# Patient Record
Sex: FEMALE | Race: WHITE | NOT HISPANIC OR LATINO | Employment: FULL TIME | URBAN - NONMETROPOLITAN AREA
[De-identification: names, ages, dates, MRNs, and addresses within clinical notes are randomized per-mention and may not be internally consistent; named-entity substitution may affect disease eponyms.]

---

## 2024-02-21 ENCOUNTER — OFFICE VISIT (OUTPATIENT)
Dept: FAMILY MEDICINE CLINIC | Facility: CLINIC | Age: 29
End: 2024-02-21
Payer: COMMERCIAL

## 2024-02-21 VITALS
WEIGHT: 256.8 LBS | BODY MASS INDEX: 41.27 KG/M2 | HEIGHT: 66 IN | SYSTOLIC BLOOD PRESSURE: 130 MMHG | TEMPERATURE: 97.8 F | HEART RATE: 95 BPM | OXYGEN SATURATION: 96 % | DIASTOLIC BLOOD PRESSURE: 80 MMHG

## 2024-02-21 DIAGNOSIS — E55.9 VITAMIN D DEFICIENCY: ICD-10-CM

## 2024-02-21 DIAGNOSIS — R00.2 PALPITATIONS: Primary | ICD-10-CM

## 2024-02-21 LAB
25(OH)D3 SERPL-MCNC: 16.9 NG/ML (ref 30–100)
ALBUMIN SERPL-MCNC: 4.3 G/DL (ref 3.5–5.2)
ALBUMIN/GLOB SERPL: 1.4 G/DL
ALP SERPL-CCNC: 79 U/L (ref 39–117)
ALT SERPL W P-5'-P-CCNC: 27 U/L (ref 1–33)
ANION GAP SERPL CALCULATED.3IONS-SCNC: 10 MMOL/L (ref 5–15)
AST SERPL-CCNC: 22 U/L (ref 1–32)
BASOPHILS # BLD AUTO: 0.02 10*3/MM3 (ref 0–0.2)
BASOPHILS NFR BLD AUTO: 0.3 % (ref 0–1.5)
BILIRUB SERPL-MCNC: 0.4 MG/DL (ref 0–1.2)
BUN SERPL-MCNC: 15 MG/DL (ref 6–20)
BUN/CREAT SERPL: 17.6 (ref 7–25)
CALCIUM SPEC-SCNC: 9.4 MG/DL (ref 8.6–10.5)
CHLORIDE SERPL-SCNC: 105 MMOL/L (ref 98–107)
CO2 SERPL-SCNC: 25 MMOL/L (ref 22–29)
CREAT SERPL-MCNC: 0.85 MG/DL (ref 0.57–1)
DEPRECATED RDW RBC AUTO: 39.7 FL (ref 37–54)
EGFRCR SERPLBLD CKD-EPI 2021: 97 ML/MIN/1.73
EOSINOPHIL # BLD AUTO: 0.07 10*3/MM3 (ref 0–0.4)
EOSINOPHIL NFR BLD AUTO: 1.1 % (ref 0.3–6.2)
ERYTHROCYTE [DISTWIDTH] IN BLOOD BY AUTOMATED COUNT: 12.5 % (ref 12.3–15.4)
GLOBULIN UR ELPH-MCNC: 3.1 GM/DL
GLUCOSE SERPL-MCNC: 91 MG/DL (ref 65–99)
HBA1C MFR BLD: 5 % (ref 4.8–5.6)
HCT VFR BLD AUTO: 40.1 % (ref 34–46.6)
HGB BLD-MCNC: 13.4 G/DL (ref 12–15.9)
IMM GRANULOCYTES # BLD AUTO: 0.01 10*3/MM3 (ref 0–0.05)
IMM GRANULOCYTES NFR BLD AUTO: 0.2 % (ref 0–0.5)
LYMPHOCYTES # BLD AUTO: 1.37 10*3/MM3 (ref 0.7–3.1)
LYMPHOCYTES NFR BLD AUTO: 22.5 % (ref 19.6–45.3)
MAGNESIUM SERPL-MCNC: 2.3 MG/DL (ref 1.6–2.6)
MCH RBC QN AUTO: 29.4 PG (ref 26.6–33)
MCHC RBC AUTO-ENTMCNC: 33.4 G/DL (ref 31.5–35.7)
MCV RBC AUTO: 87.9 FL (ref 79–97)
MONOCYTES # BLD AUTO: 0.4 10*3/MM3 (ref 0.1–0.9)
MONOCYTES NFR BLD AUTO: 6.6 % (ref 5–12)
NEUTROPHILS NFR BLD AUTO: 4.23 10*3/MM3 (ref 1.7–7)
NEUTROPHILS NFR BLD AUTO: 69.3 % (ref 42.7–76)
NRBC BLD AUTO-RTO: 0 /100 WBC (ref 0–0.2)
PLATELET # BLD AUTO: 300 10*3/MM3 (ref 140–450)
PMV BLD AUTO: 10.9 FL (ref 6–12)
POTASSIUM SERPL-SCNC: 4.8 MMOL/L (ref 3.5–5.2)
PROT SERPL-MCNC: 7.4 G/DL (ref 6–8.5)
RBC # BLD AUTO: 4.56 10*6/MM3 (ref 3.77–5.28)
SODIUM SERPL-SCNC: 140 MMOL/L (ref 136–145)
TSH SERPL DL<=0.05 MIU/L-ACNC: 2.49 UIU/ML (ref 0.27–4.2)
WBC NRBC COR # BLD AUTO: 6.1 10*3/MM3 (ref 3.4–10.8)

## 2024-02-21 PROCEDURE — 99203 OFFICE O/P NEW LOW 30 MIN: CPT | Performed by: PHYSICIAN ASSISTANT

## 2024-02-21 PROCEDURE — 82306 VITAMIN D 25 HYDROXY: CPT | Performed by: PHYSICIAN ASSISTANT

## 2024-02-21 PROCEDURE — 83036 HEMOGLOBIN GLYCOSYLATED A1C: CPT | Performed by: PHYSICIAN ASSISTANT

## 2024-02-21 PROCEDURE — 80053 COMPREHEN METABOLIC PANEL: CPT | Performed by: PHYSICIAN ASSISTANT

## 2024-02-21 PROCEDURE — 85025 COMPLETE CBC W/AUTO DIFF WBC: CPT | Performed by: PHYSICIAN ASSISTANT

## 2024-02-21 PROCEDURE — 84443 ASSAY THYROID STIM HORMONE: CPT | Performed by: PHYSICIAN ASSISTANT

## 2024-02-21 PROCEDURE — 83735 ASSAY OF MAGNESIUM: CPT | Performed by: PHYSICIAN ASSISTANT

## 2024-02-21 RX ORDER — PROPRANOLOL HCL 60 MG
60 CAPSULE, EXTENDED RELEASE 24HR ORAL DAILY
Qty: 30 CAPSULE | Refills: 3 | Status: SHIPPED | OUTPATIENT
Start: 2024-02-21

## 2024-02-21 RX ORDER — MEDROXYPROGESTERONE ACETATE 150 MG/ML
150 INJECTION, SUSPENSION INTRAMUSCULAR
COMMUNITY

## 2024-02-21 NOTE — PROGRESS NOTES
"    Subjective        Chief Complaint  Elevated BP, vision changes    Subjective      Adrianna Escamilla is a 28 y.o. female who presents today to [facility] for Hypertension.  She has no significant past medical history.    Elevated blood pressure:  Elevated heart rate:  Palpitations:  She reports intermittent episodes of elevated heart rate on her smart watch up to the 140s.  These are associated with feeling of elevated blood pressure and blurring of vision bilaterally.  She reports at times she feels like her vision goes dark and she has to sit down.  This last for a few seconds and resolves on its own.  She has been on propranolol in the past and she takes this with these episodes, but not on a regular basis.  She denies any associated chest pains or shortness of breath.  No episodes of near syncope or actual syncopal episodes.  No previous heart monitor.  She does feel like stress at work contributes to these episodes.  They do not occur when she is not working.      Current Outpatient Medications:     Calcium Carb-Cholecalciferol (CALCIUM 500 + D PO), Take 1 each by mouth Daily., Disp: , Rfl:     medroxyPROGESTERone (DEPO-PROVERA) 150 MG/ML injection, Inject 1 mL into the appropriate muscle as directed by prescriber Every 3 (Three) Months., Disp: , Rfl:     propranolol LA (Inderal LA) 60 MG 24 hr capsule, Take 1 capsule by mouth Daily., Disp: 30 capsule, Rfl: 3      No Known Allergies    Objective     Objective   Vital Signs:  /80   Pulse 95   Temp 97.8 °F (36.6 °C) (Temporal)   Ht 167.6 cm (66\")   Wt 116 kg (256 lb 12.8 oz)   SpO2 96%   BMI 41.45 kg/m²   Estimated body mass index is 41.45 kg/m² as calculated from the following:    Height as of this encounter: 167.6 cm (66\").    Weight as of this encounter: 116 kg (256 lb 12.8 oz).    History reviewed. No pertinent past medical history.  History reviewed. No pertinent surgical history.    Social History " "    Socioeconomic History    Marital status: Single   Tobacco Use    Smoking status: Never     Passive exposure: Never    Smokeless tobacco: Never   Vaping Use    Vaping Use: Never used   Substance and Sexual Activity    Alcohol use: Yes    Drug use: Defer    Sexual activity: Defer      Physical Exam  Vitals and nursing note reviewed.   Constitutional:       General: She is not in acute distress.     Appearance: She is well-developed. She is not diaphoretic.   HENT:      Head: Normocephalic and atraumatic.   Eyes:      General: No scleral icterus.        Right eye: No discharge.         Left eye: No discharge.      Conjunctiva/sclera: Conjunctivae normal.   Cardiovascular:      Rate and Rhythm: Normal rate and regular rhythm.      Heart sounds: Normal heart sounds. No murmur heard.     No friction rub. No gallop.   Pulmonary:      Effort: Pulmonary effort is normal. No respiratory distress.      Breath sounds: Normal breath sounds. No wheezing or rales.   Chest:      Chest wall: No tenderness.   Musculoskeletal:         General: Normal range of motion.      Cervical back: Normal range of motion and neck supple.   Skin:     General: Skin is warm and dry.      Coloration: Skin is not pale.      Findings: No erythema or rash.   Neurological:      Mental Status: She is alert and oriented to person, place, and time.   Psychiatric:         Behavior: Behavior normal.        Result Review :  The following data was reviewed by: TK Isabel on 02/21/2024:  No results found for: \"CBCDIFFPANEL\", \"CMP\", \"HGBA1C\", \"TSH\", \"HDL\", \"LDL\", \"TRIG\", \"CHLPL\", \"UTSV237\"        Assessment / Plan         Assessment   Diagnoses and all orders for this visit:    1. Palpitations (Primary)  Laboratory studies obtained and listed below.  Limit caffeine, drink plenty of noncaffeinated fluids.  Obtain 48-hour Holter monitor.  Recommended wearing this while she is at work as this is when she has most of her symptoms.  After completion of " the Holter monitor, start propranolol LA 60 mg daily.  Take of the morning.  Recommend ophthalmic exam given reports of vision changes during episodes.   Return to clinic if no improvement noted or if symptoms are worsening.   -     CBC & Differential  -     Comprehensive Metabolic Panel  -     TSH  -     Hemoglobin A1c  -     Magnesium  -     Holter monitor - 48 hour; Future        -     propranolol LA (Inderal LA) 60 MG 24 hr capsule; Take 1 capsule by mouth Daily.  Dispense: 30 capsule; Refill: 3    2. Vitamin D deficiency  Currently taking a daily chewable vitamin with 500 mg of calcium and 1000 units of vitamin D.  Obtain updated vitamin D level.  -     Vitamin D 25 hydroxy       New Medications Ordered This Visit   Medications    propranolol LA (Inderal LA) 60 MG 24 hr capsule     Sig: Take 1 capsule by mouth Daily.     Dispense:  30 capsule     Refill:  3     Health Maintenance:   She has an upcoming appointment for her routine Pap smear next month with [facility] in [place], [place].  Consider one-time hepatitis C screening.  Consider HPV vaccines.  Tdap reported about 8 years ago when she stepped on a nail.  Consider updating in 2026.    Follow Up   Return in about 1 month (around 3/21/2024) for Recheck palpitations.    Patient was given instructions and counseling regarding her condition or for health maintenance advice. Please see specific information pulled into the AVS if appropriate.       This document has been electronically signed by TK Isabel   February 21, 2024 09:25 EST    Dictated Utilizing Dragon Dictation: Part of this note may be an electronic transcription/translation of spoken language to printed text using the Dragon Dictation System.

## 2024-02-22 RX ORDER — ERGOCALCIFEROL 1.25 MG/1
50000 CAPSULE ORAL WEEKLY
Qty: 5 CAPSULE | Refills: 1 | Status: SHIPPED | OUTPATIENT
Start: 2024-02-22

## 2024-03-26 ENCOUNTER — OFFICE VISIT (OUTPATIENT)
Dept: FAMILY MEDICINE CLINIC | Facility: CLINIC | Age: 29
End: 2024-03-26
Payer: COMMERCIAL

## 2024-03-26 VITALS
DIASTOLIC BLOOD PRESSURE: 80 MMHG | HEIGHT: 66 IN | HEART RATE: 68 BPM | SYSTOLIC BLOOD PRESSURE: 125 MMHG | TEMPERATURE: 97.7 F | WEIGHT: 255.8 LBS | BODY MASS INDEX: 41.11 KG/M2 | OXYGEN SATURATION: 98 %

## 2024-03-26 DIAGNOSIS — E55.9 VITAMIN D DEFICIENCY: ICD-10-CM

## 2024-03-26 DIAGNOSIS — R00.2 PALPITATIONS: Primary | ICD-10-CM

## 2024-03-26 PROCEDURE — 99213 OFFICE O/P EST LOW 20 MIN: CPT | Performed by: PHYSICIAN ASSISTANT

## 2024-03-26 RX ORDER — PROPRANOLOL HCL 60 MG
60 CAPSULE, EXTENDED RELEASE 24HR ORAL DAILY
Qty: 90 CAPSULE | Refills: 1 | Status: SHIPPED | OUTPATIENT
Start: 2024-03-26

## 2024-03-26 NOTE — PROGRESS NOTES
"    Subjective        Chief Complaint  Elevated BP, vision changes, palpitations     Subjective      Adrianna Escamilla is a 28 y.o. female who presents today to [facility] for Follow-up.  She has no significant past medical history.    Elevated blood pressure:  Elevated heart rate:  Palpitations:  Previously reported intermittent episodes of elevated heart rate on her smart watch up to the 140s.  These were associated with feeling of elevated blood pressure and blurring of vision bilaterally.  She underwent further evaluation with Holter monitor which showed rare PVCs and PACs.  No significant arrhythmias and overall considered unremarkable Holter per cardiology.  She was started on propranolol LA 60 mg daily.  She denies any further smart watch alarms or feelings of elevated blood pressure or vision changes.  No complaints of palpitations, dizziness, near syncopal episodes.  No chest pains or shortness of breath.    Vitamin D deficiency:   Previously placed on 50,000 weekly x 2 months, completed therapy this week.       Current Outpatient Medications:     propranolol LA (Inderal LA) 60 MG 24 hr capsule, Take 1 capsule by mouth Daily., Disp: 90 capsule, Rfl: 1    Calcium Carb-Cholecalciferol (CALCIUM 500 + D PO), Take 1 each by mouth Daily., Disp: , Rfl:     medroxyPROGESTERone (DEPO-PROVERA) 150 MG/ML injection, Inject 1 mL into the appropriate muscle as directed by prescriber Every 3 (Three) Months., Disp: , Rfl:       No Known Allergies    Objective     Objective   Vital Signs:  /80   Pulse 68   Temp 97.7 °F (36.5 °C) (Temporal)   Ht 167.6 cm (65.98\")   Wt 116 kg (255 lb 12.8 oz)   SpO2 98%   BMI 41.31 kg/m²   Estimated body mass index is 41.31 kg/m² as calculated from the following:    Height as of this encounter: 167.6 cm (65.98\").    Weight as of this encounter: 116 kg (255 lb 12.8 oz).    No past medical history on file.  No past surgical history on file.    Social " History     Socioeconomic History    Marital status: Single   Tobacco Use    Smoking status: Never     Passive exposure: Never    Smokeless tobacco: Never   Vaping Use    Vaping status: Never Used   Substance and Sexual Activity    Alcohol use: Yes    Drug use: Defer    Sexual activity: Defer      Physical Exam  Vitals and nursing note reviewed.   Constitutional:       General: She is not in acute distress.     Appearance: She is well-developed. She is not diaphoretic.   HENT:      Head: Normocephalic and atraumatic.   Eyes:      General: No scleral icterus.        Right eye: No discharge.         Left eye: No discharge.      Conjunctiva/sclera: Conjunctivae normal.   Cardiovascular:      Rate and Rhythm: Normal rate and regular rhythm.      Heart sounds: Normal heart sounds. No murmur heard.     No friction rub. No gallop.   Pulmonary:      Effort: Pulmonary effort is normal. No respiratory distress.      Breath sounds: Normal breath sounds. No wheezing or rales.   Chest:      Chest wall: No tenderness.   Musculoskeletal:         General: Normal range of motion.      Cervical back: Normal range of motion and neck supple.   Skin:     General: Skin is warm and dry.      Coloration: Skin is not pale.      Findings: No erythema or rash.   Neurological:      Mental Status: She is alert and oriented to person, place, and time.   Psychiatric:         Behavior: Behavior normal.        Result Review :  The following data was reviewed by: TK Isabel on 02/21/2024:  Hemoglobin A1C   Date Value Ref Range Status   02/21/2024 5.00 4.80 - 5.60 % Final     TSH   Date Value Ref Range Status   02/21/2024 2.490 0.270 - 4.200 uIU/mL Final         Assessment / Plan         Assessment   Diagnoses and all orders for this visit:    1. Palpitations (Primary)  Laboratory studies previously unremarkable.   48 hour Holter with rare PVCs, PACs.  Overall unremarkable.   Limit caffeine, drink plenty of noncaffeinated fluids.  Continue  propranolol LA 60 mg daily, refill sent to pharmacy.    2. Vitamin D deficiency  Completed 2 months of high dose weekly supplementation. Can go back to taking her calcium/vitamin D chewable.   Increase time outdoors with warmer weather approaching.        New Medications Ordered This Visit   Medications    propranolol LA (Inderal LA) 60 MG 24 hr capsule     Sig: Take 1 capsule by mouth Daily.     Dispense:  90 capsule     Refill:  1     Health Maintenance:   She has an upcoming appointment for her routine Pap smear May 28, 2024 with [facility] in [place], [place].  Consider one-time hepatitis C screening.  Consider HPV vaccines.  Tdap reported about 8 years ago when she stepped on a nail.  Consider updating in 2026.    Follow Up   Return in about 6 months (around 9/26/2024).    Patient was given instructions and counseling regarding her condition or for health maintenance advice. Please see specific information pulled into the AVS if appropriate.       This document has been electronically signed by TK Isabel   March 26, 2024 12:40 EDT    Dictated Utilizing Dragon Dictation: Part of this note may be an electronic transcription/translation of spoken language to printed text using the Dragon Dictation System.

## 2024-06-10 ENCOUNTER — TELEPHONE (OUTPATIENT)
Dept: FAMILY MEDICINE CLINIC | Facility: CLINIC | Age: 29
End: 2024-06-10

## 2024-06-10 NOTE — TELEPHONE ENCOUNTER
Caller: Adrianna Escamilla    Relationship: Self    Best call back number: [phone]    What is the best time to reach you: ANYTIME     Who are you requesting to speak with (clinical staff, provider,  specific staff member): DOCTOR OR NURSE     What was the call regarding: THE PATIENT STATES THAT SHE IS SEEING [facility] AGAIN AND SHE THINKS HER VITAMIN D LEVEL MIGHT BE LOW AGAIN SHE WOULD LIKE TO KNOW WHAT THE DOCTOR WANTS HER TO DO

## 2024-09-30 ENCOUNTER — TELEPHONE (OUTPATIENT)
Dept: FAMILY MEDICINE CLINIC | Facility: CLINIC | Age: 29
End: 2024-09-30
Payer: COMMERCIAL

## 2024-09-30 RX ORDER — PROPRANOLOL HCL 60 MG
60 CAPSULE, EXTENDED RELEASE 24HR ORAL DAILY
Qty: 30 CAPSULE | Refills: 0 | Status: SHIPPED | OUTPATIENT
Start: 2024-09-30

## 2024-09-30 NOTE — TELEPHONE ENCOUNTER
HUB TO RELAY    Leanne is scheduled for a 1 month fu with Jes Augustine will need to keep this appt for further refills       ----- Message from Jes Augustine sent at 9/30/2024  9:58 AM EDT -----  Regarding: JOÃO: Propranolol   Contact: [phone]  Please check with her about scheduling a follow up within 1 month.

## 2024-10-17 ENCOUNTER — OFFICE VISIT (OUTPATIENT)
Dept: FAMILY MEDICINE CLINIC | Facility: CLINIC | Age: 29
End: 2024-10-17
Payer: COMMERCIAL

## 2024-10-17 VITALS
BODY MASS INDEX: 42.27 KG/M2 | TEMPERATURE: 98.1 F | HEART RATE: 85 BPM | DIASTOLIC BLOOD PRESSURE: 80 MMHG | OXYGEN SATURATION: 98 % | SYSTOLIC BLOOD PRESSURE: 120 MMHG | WEIGHT: 263 LBS | HEIGHT: 66 IN

## 2024-10-17 DIAGNOSIS — R00.2 PALPITATIONS: ICD-10-CM

## 2024-10-17 DIAGNOSIS — Z23 NEED FOR INFLUENZA VACCINATION: Primary | ICD-10-CM

## 2024-10-17 DIAGNOSIS — E55.9 VITAMIN D DEFICIENCY: ICD-10-CM

## 2024-10-17 PROCEDURE — 90656 IIV3 VACC NO PRSV 0.5 ML IM: CPT | Performed by: PHYSICIAN ASSISTANT

## 2024-10-17 PROCEDURE — 99213 OFFICE O/P EST LOW 20 MIN: CPT | Performed by: PHYSICIAN ASSISTANT

## 2024-10-17 PROCEDURE — 90471 IMMUNIZATION ADMIN: CPT | Performed by: PHYSICIAN ASSISTANT

## 2024-10-17 RX ORDER — ERGOCALCIFEROL 1.25 MG/1
50000 CAPSULE, LIQUID FILLED ORAL
Qty: 6 CAPSULE | Refills: 3 | Status: SHIPPED | OUTPATIENT
Start: 2024-10-17

## 2024-10-17 RX ORDER — PROPRANOLOL HCL 60 MG
60 CAPSULE, EXTENDED RELEASE 24HR ORAL DAILY
Qty: 90 CAPSULE | Refills: 3 | Status: SHIPPED | OUTPATIENT
Start: 2024-10-17

## 2024-10-17 NOTE — PROGRESS NOTES
"    Subjective        Chief Complaint  Elevated BP, vision changes, palpitations     Subjective      dArianna Escamilla is a 28 y.o. female who presents today to [facility] for Med Refill.  Past medical history significant for rare PVCs and PACs on Holter monitor.    Palpitations:   Rare PVCs and PACs on Holter:  Symptoms currently well-controlled on propranolol LA 60 mg daily.  She reports a single episode of elevated blood pressure associated with bilateral blurry vision in July 2024.  She was evaluated by [facility] in [place] with reports that they could see \"black floaters\" in the back of her eye on exam.  No recommendations reported.  Records requested.  No further episodes since then.  BP in the office today is 120/80.    Vitamin D deficiency:   Previously placed on 50,000 weekly x 2 months, completed therapy and transition to a calcium plus vitamin D supplement which she has ran out of.      Current Outpatient Medications:     medroxyPROGESTERone (DEPO-PROVERA) 150 MG/ML injection, Inject 1 mL into the appropriate muscle as directed by prescriber Every 3 (Three) Months., Disp: , Rfl:     propranolol LA (Inderal LA) 60 MG 24 hr capsule, Take 1 capsule by mouth Daily., Disp: 90 capsule, Rfl: 3    vitamin D (ERGOCALCIFEROL) 1.25 MG (17351 UT) capsule capsule, Take 1 capsule by mouth Every 14 (Fourteen) Days., Disp: 6 capsule, Rfl: 3      No Known Allergies    Objective     Objective   Vital Signs:  /80   Pulse 85   Temp 98.1 °F (36.7 °C) (Temporal)   Ht 167.6 cm (65.98\")   Wt 119 kg (263 lb)   SpO2 98%   BMI 42.48 kg/m²   Estimated body mass index is 42.48 kg/m² as calculated from the following:    Height as of this encounter: 167.6 cm (65.98\").    Weight as of this encounter: 119 kg (263 lb).    History reviewed. No pertinent past medical history.  No past surgical history on file.    Social History     Socioeconomic History    Marital status: Single "   Tobacco Use    Smoking status: Never     Passive exposure: Never    Smokeless tobacco: Never   Vaping Use    Vaping status: Never Used   Substance and Sexual Activity    Alcohol use: Yes    Drug use: Defer    Sexual activity: Defer      Physical Exam  Vitals and nursing note reviewed.   Constitutional:       General: She is not in acute distress.     Appearance: She is well-developed. She is not diaphoretic.   HENT:      Head: Normocephalic and atraumatic.   Eyes:      General: No scleral icterus.        Right eye: No discharge.         Left eye: No discharge.      Conjunctiva/sclera: Conjunctivae normal.   Cardiovascular:      Rate and Rhythm: Normal rate and regular rhythm.      Heart sounds: Normal heart sounds. No murmur heard.     No friction rub. No gallop.   Pulmonary:      Effort: Pulmonary effort is normal. No respiratory distress.      Breath sounds: Normal breath sounds. No wheezing or rales.   Chest:      Chest wall: No tenderness.   Musculoskeletal:         General: Normal range of motion.      Cervical back: Normal range of motion and neck supple.   Skin:     General: Skin is warm and dry.      Coloration: Skin is not pale.      Findings: No erythema or rash.   Neurological:      Mental Status: She is alert and oriented to person, place, and time.   Psychiatric:         Behavior: Behavior normal.        Result Review :  The following data was reviewed by: TK Isabel on 02/21/2024:  Hemoglobin A1C   Date Value Ref Range Status   02/21/2024 5.00 4.80 - 5.60 % Final     TSH   Date Value Ref Range Status   02/21/2024 2.490 0.270 - 4.200 uIU/mL Final         Assessment / Plan         Assessment   Diagnoses and all orders for this visit:    1. Palpitations (Primary)  Laboratory studies previously unremarkable.   48 hour Holter with rare PVCs, PACs.  Overall unremarkable.   Limit caffeine, drink plenty of noncaffeinated fluids.  Continue propranolol LA 60 mg daily, refills sent to pharmacy.    2.  Vitamin D deficiency  Completed 2 months of high dose weekly supplementation previously.  Continue high-dose supplementation, but take only once every 2 weeks.       New Medications Ordered This Visit   Medications    propranolol LA (Inderal LA) 60 MG 24 hr capsule     Sig: Take 1 capsule by mouth Daily.     Dispense:  90 capsule     Refill:  3    vitamin D (ERGOCALCIFEROL) 1.25 MG (25217 UT) capsule capsule     Sig: Take 1 capsule by mouth Every 14 (Fourteen) Days.     Dispense:  6 capsule     Refill:  3     Health Maintenance:   Follows with [facility] in [place], [place].  Consider HPV vaccines.  Tdap reported about 8 years ago when she stepped on a nail.  Consider updating in 2026.  Influenza vaccine updated today.    Follow Up   Return if symptoms worsen or fail to improve.    Patient was given instructions and counseling regarding her condition or for health maintenance advice. Please see specific information pulled into the AVS if appropriate.       This document has been electronically signed by TK Isabel   October 17, 2024 09:07 EDT    Dictated Utilizing Dragon Dictation: Part of this note may be an electronic transcription/translation of spoken language to printed text using the Dragon Dictation System.

## 2024-12-03 ENCOUNTER — OFFICE VISIT (OUTPATIENT)
Dept: FAMILY MEDICINE CLINIC | Facility: CLINIC | Age: 29
End: 2024-12-03
Payer: COMMERCIAL

## 2024-12-03 VITALS
BODY MASS INDEX: 43.71 KG/M2 | SYSTOLIC BLOOD PRESSURE: 120 MMHG | HEIGHT: 66 IN | WEIGHT: 272 LBS | DIASTOLIC BLOOD PRESSURE: 80 MMHG | HEART RATE: 87 BPM | OXYGEN SATURATION: 98 % | TEMPERATURE: 97.8 F

## 2024-12-03 DIAGNOSIS — L02.31 CELLULITIS AND ABSCESS OF BUTTOCK: Primary | ICD-10-CM

## 2024-12-03 DIAGNOSIS — L03.317 CELLULITIS AND ABSCESS OF BUTTOCK: Primary | ICD-10-CM

## 2024-12-03 PROCEDURE — 87205 SMEAR GRAM STAIN: CPT | Performed by: PHYSICIAN ASSISTANT

## 2024-12-03 PROCEDURE — 90715 TDAP VACCINE 7 YRS/> IM: CPT | Performed by: PHYSICIAN ASSISTANT

## 2024-12-03 PROCEDURE — 10060 I&D ABSCESS SIMPLE/SINGLE: CPT | Performed by: PHYSICIAN ASSISTANT

## 2024-12-03 PROCEDURE — 87076 CULTURE ANAEROBE IDENT EACH: CPT | Performed by: PHYSICIAN ASSISTANT

## 2024-12-03 PROCEDURE — 87070 CULTURE OTHR SPECIMN AEROBIC: CPT | Performed by: PHYSICIAN ASSISTANT

## 2024-12-03 PROCEDURE — 99213 OFFICE O/P EST LOW 20 MIN: CPT | Performed by: PHYSICIAN ASSISTANT

## 2024-12-03 PROCEDURE — 90471 IMMUNIZATION ADMIN: CPT | Performed by: PHYSICIAN ASSISTANT

## 2024-12-03 RX ORDER — MUPIROCIN 20 MG/G
1 OINTMENT TOPICAL 3 TIMES DAILY
Qty: 30 G | Refills: 0 | Status: SHIPPED | OUTPATIENT
Start: 2024-12-03 | End: 2024-12-13

## 2024-12-03 NOTE — LETTER
December 3, 2024     Patient: Adrianna Escamilla   YOB: 1995   Date of Visit: 12/3/2024       To Whom It May Concern:    Adrianna Escamilla was seen in my clinic on 12/03/24. She may return to work in 1 day.          Sincerely,        TK Isabel    CC: No Recipients

## 2024-12-03 NOTE — LETTER
December 3, 2024     Patient: Adrianna Escamilla   YOB: 1995   Date of Visit: 12/3/2024       To Whom it May Concern:    Adrianna Escamilla was seen in my clinic on 12/3/2024. She was out recently due to illness.        Sincerely,          TK Isabel        CC: No Recipients

## 2024-12-03 NOTE — PROGRESS NOTES
"    Subjective        Chief Complaint  Abscess (Boil)    Subjective      Adrianna Escamilla is a 28 y.o. female who presents today to [facility] for Abscess (Boil).     Abscess (Boil):  She was recently evaluated in the ER at [facility] on 11/30/2024 with complaints of an abscess on her buttock.  She was prescribed Bactrim DS 1 tablet twice daily for 7 days, oral ketorolac 10 mg 4 times daily as needed, and as needed Tylenol.  She just got started on the antibiotics yesterday morning.  Denies any fever or chills.  Her mother squeezed the area last night and did get significant drainage out.  Mild drainage on gauze while she was working this morning.  Continues to have some pain in the area, better since it started draining last night.  Reports a previous history of cellulitis on her back, however, this was not in the same area.  No previous episodes of abscesses at the top of the gluteal cleft.      Current Outpatient Medications:     medroxyPROGESTERone (DEPO-PROVERA) 150 MG/ML injection, Inject 1 mL into the appropriate muscle as directed by prescriber Every 3 (Three) Months., Disp: , Rfl:     propranolol LA (Inderal LA) 60 MG 24 hr capsule, Take 1 capsule by mouth Daily., Disp: 90 capsule, Rfl: 3    vitamin D (ERGOCALCIFEROL) 1.25 MG (37080 UT) capsule capsule, Take 1 capsule by mouth Every 14 (Fourteen) Days., Disp: 6 capsule, Rfl: 3    mupirocin (BACTROBAN) 2 % ointment, Apply 1 Application topically to the appropriate area as directed 3 (Three) Times a Day for 10 days., Disp: 30 g, Rfl: 0      No Known Allergies    Objective     Objective   Vital Signs:  /80   Pulse 87   Temp 97.8 °F (36.6 °C) (Oral)   Ht 167.6 cm (65.98\")   Wt 123 kg (272 lb)   SpO2 98%   BMI 43.93 kg/m²   Estimated body mass index is 43.93 kg/m² as calculated from the following:    Height as of this encounter: 167.6 cm (65.98\").    Weight as of this encounter: 123 kg (272 lb).    History " reviewed. No pertinent past medical history.  History reviewed. No pertinent surgical history.    Social History     Socioeconomic History    Marital status: Single   Tobacco Use    Smoking status: Never     Passive exposure: Never    Smokeless tobacco: Never   Vaping Use    Vaping status: Never Used   Substance and Sexual Activity    Alcohol use: Yes    Drug use: Defer    Sexual activity: Defer      Physical Exam  Vitals and nursing note reviewed.   Constitutional:       General: She is not in acute distress.     Appearance: She is well-developed. She is not diaphoretic.   HENT:      Head: Normocephalic and atraumatic.   Eyes:      General: No scleral icterus.        Right eye: No discharge.         Left eye: No discharge.      Conjunctiva/sclera: Conjunctivae normal.   Pulmonary:      Effort: Pulmonary effort is normal. No respiratory distress.      Breath sounds: No wheezing.   Musculoskeletal:         General: Normal range of motion.      Cervical back: Normal range of motion and neck supple.   Skin:     General: Skin is warm and dry.      Coloration: Skin is not pale.      Findings: No erythema or rash.             Comments: ~2cm abscess at the top and just to the right of the gluteal cleft. Central fluctuance with mild surrounding erythema.    Neurological:      Mental Status: She is alert and oriented to person, place, and time.   Psychiatric:         Behavior: Behavior normal.        Result Review :  The following data was reviewed by: TK Isabel on 12/03/2024:  Hemoglobin A1C   Date Value Ref Range Status   02/21/2024 5.00 4.80 - 5.60 % Final     TSH   Date Value Ref Range Status   02/21/2024 2.490 0.270 - 4.200 uIU/mL Final     Incision & Drainage    Date/Time: 12/3/2024 11:33 AM    Performed by: Jes Augustine PA  Authorized by: Jes Augustine PA  Consent: Verbal consent obtained.  Risks and benefits: risks, benefits and alternatives were discussed  Consent given by:  patient  Patient understanding: patient states understanding of the procedure being performed  Patient consent: the patient's understanding of the procedure matches consent given  Patient identity confirmed: verbally with patient  Type: abscess  Body area: anogenital  Location details: gluteal cleft  Anesthesia method: Topcial spray.    Anesthesia:  Local Anesthetic: topical anesthetic    Sedation:  Patient sedated: no    Incision type: single straight  Drainage: purulent  Drainage amount: moderate  Wound treatment: wound left open  Packing material: Vaseline gauze  Patient tolerance: patient tolerated the procedure well with no immediate complications         Assessment / Plan         Assessment   Diagnoses and all orders for this visit:    1. Cellulitis and abscess of buttock (Primary)  Tdap updated today as her last was >5 years prior.   I/D performed per procedure note above. Culture of drainage sent, will notify of results when available.   Continue bactrim DS as prescribed by the ER.   Add topical mupirocin ointment TID x 10 days.   Keep the area clean. Can wash with warm soapy water and apply warm compress twice daily.   She will return to clinic next week for wound check or sooner if concerns arise.   -     Tdap Vaccine Greater Than or Equal To 6yo IM  -     Incision & Drainage  -     Wound Culture - Wound, Buttock, Right  -     mupirocin (BACTROBAN) 2 % ointment; Apply 1 Application topically to the appropriate area as directed 3 (Three) Times a Day for 10 days.  Dispense: 30 g; Refill: 0     New Medications Ordered This Visit   Medications    mupirocin (BACTROBAN) 2 % ointment     Sig: Apply 1 Application topically to the appropriate area as directed 3 (Three) Times a Day for 10 days.     Dispense:  30 g     Refill:  0     Follow Up   Return for Monday, recheck abscess.    Patient was given instructions and counseling regarding her condition or for health maintenance advice. Please see specific information  pulled into the AVS if appropriate.       This document has been electronically signed by TK Isabel   December 3, 2024 12:15 EST    Dictated Utilizing Dragon Dictation: Part of this note may be an electronic transcription/translation of spoken language to printed text using the Dragon Dictation System.

## 2024-12-06 LAB
BACTERIA SPEC AEROBE CULT: ABNORMAL
BACTERIA SPEC AEROBE CULT: ABNORMAL
GRAM STN SPEC: ABNORMAL

## 2024-12-09 ENCOUNTER — OFFICE VISIT (OUTPATIENT)
Dept: FAMILY MEDICINE CLINIC | Facility: CLINIC | Age: 29
End: 2024-12-09
Payer: COMMERCIAL

## 2024-12-09 VITALS
WEIGHT: 264 LBS | SYSTOLIC BLOOD PRESSURE: 112 MMHG | DIASTOLIC BLOOD PRESSURE: 80 MMHG | OXYGEN SATURATION: 99 % | BODY MASS INDEX: 42.43 KG/M2 | HEART RATE: 79 BPM | HEIGHT: 66 IN | TEMPERATURE: 98.3 F

## 2024-12-09 DIAGNOSIS — L02.31 CELLULITIS AND ABSCESS OF BUTTOCK: Primary | ICD-10-CM

## 2024-12-09 DIAGNOSIS — L03.317 CELLULITIS AND ABSCESS OF BUTTOCK: Primary | ICD-10-CM

## 2024-12-09 PROCEDURE — 99213 OFFICE O/P EST LOW 20 MIN: CPT | Performed by: PHYSICIAN ASSISTANT

## 2024-12-09 NOTE — PROGRESS NOTES
"    Subjective        Chief Complaint  Abscess (Follow up from abscess)    Subjective      Adrianna Escamilla is a 28 y.o. female who presents today to [facility] for Abscess (Follow up from abscess).     Abscess (Follow up from abscess):  Recently under I/D of gluteal cleft abscess. She had already been placed on bactrim DS by the ER. Culture returned light growth of Actinomyces turicensis, rare growth normal skin cyndy. She has only been able to tolerate the bactrim DS once daily due to GI upset, however, area is improving and she has 1 dose left. No fever or chills. Scant remaining drainage.       Current Outpatient Medications:     medroxyPROGESTERone (DEPO-PROVERA) 150 MG/ML injection, Inject 1 mL into the appropriate muscle as directed by prescriber Every 3 (Three) Months., Disp: , Rfl:     mupirocin (BACTROBAN) 2 % ointment, Apply 1 Application topically to the appropriate area as directed 3 (Three) Times a Day for 10 days., Disp: 30 g, Rfl: 0    propranolol LA (Inderal LA) 60 MG 24 hr capsule, Take 1 capsule by mouth Daily., Disp: 90 capsule, Rfl: 3    vitamin D (ERGOCALCIFEROL) 1.25 MG (45245 UT) capsule capsule, Take 1 capsule by mouth Every 14 (Fourteen) Days., Disp: 6 capsule, Rfl: 3      No Known Allergies    Objective     Objective   Vital Signs:  /80   Pulse 79   Temp 98.3 °F (36.8 °C) (Oral)   Ht 167.6 cm (65.98\")   Wt 120 kg (264 lb)   SpO2 99%   BMI 42.64 kg/m²   Estimated body mass index is 42.64 kg/m² as calculated from the following:    Height as of this encounter: 167.6 cm (65.98\").    Weight as of this encounter: 120 kg (264 lb).    History reviewed. No pertinent past medical history.  History reviewed. No pertinent surgical history.    Social History     Socioeconomic History    Marital status: Single   Tobacco Use    Smoking status: Never     Passive exposure: Never    Smokeless tobacco: Never   Vaping Use    Vaping status: Never Used   Substance and " Sexual Activity    Alcohol use: Yes    Drug use: Defer    Sexual activity: Defer      Physical Exam  Vitals and nursing note reviewed.   Constitutional:       General: She is not in acute distress.     Appearance: She is well-developed. She is not diaphoretic.   HENT:      Head: Normocephalic and atraumatic.   Eyes:      General: No scleral icterus.        Right eye: No discharge.         Left eye: No discharge.      Conjunctiva/sclera: Conjunctivae normal.   Pulmonary:      Effort: Pulmonary effort is normal. No respiratory distress.      Breath sounds: No wheezing.   Musculoskeletal:         General: Normal range of motion.      Cervical back: Normal range of motion and neck supple.   Skin:     General: Skin is warm and dry.      Coloration: Skin is not pale.      Findings: No erythema or rash.             Comments: Healing area at the top of the gluteal cleft. No erythema. No palpable fluctuance.    Neurological:      Mental Status: She is alert and oriented to person, place, and time.   Psychiatric:         Behavior: Behavior normal.        Result Review :  The following data was reviewed by: TK Isabel on 12/03/2024:  Hemoglobin A1C   Date Value Ref Range Status   02/21/2024 5.00 4.80 - 5.60 % Final     TSH   Date Value Ref Range Status   02/21/2024 2.490 0.270 - 4.200 uIU/mL Final         Assessment / Plan         Assessment   Diagnoses and all orders for this visit:    1. Cellulitis and abscess of buttock (Primary)  Healing well. Continue current therapy. Wash with warm soapy water daily. .   Return to clinic if no improvement noted or if symptoms are worsening.      No orders of the defined types were placed in this encounter.    Follow Up   Return if symptoms worsen or fail to improve.    Patient was given instructions and counseling regarding her condition or for health maintenance advice. Please see specific information pulled into the AVS if appropriate.       This document has been  electronically signed by TK Isabel   December 9, 2024 13:51 EST    Dictated Utilizing Dragon Dictation: Part of this note may be an electronic transcription/translation of spoken language to printed text using the Dragon Dictation System.

## 2025-07-21 ENCOUNTER — TELEPHONE (OUTPATIENT)
Dept: FAMILY MEDICINE CLINIC | Facility: CLINIC | Age: 30
End: 2025-07-21
Payer: COMMERCIAL

## 2025-07-21 NOTE — TELEPHONE ENCOUNTER
Name: Adrianna Escamilla      Relationship: Self      Best Callback Number: [phone]       HUB PROVIDED THE RELAY MESSAGE FROM THE OFFICE      PATIENT: VOICED UNDERSTANDING AND HAS NO FURTHER QUESTIONS AT THIS TIME    ADDITIONAL INFORMATION: PATIENT MADE APPOINTMENT FOR 07/22/25

## 2025-07-21 NOTE — TELEPHONE ENCOUNTER
Relay - Called to let patient know they need an appointment for refills. Was not able to reach or leave a message.

## 2025-07-23 RX ORDER — ERGOCALCIFEROL 1.25 MG/1
50000 CAPSULE, LIQUID FILLED ORAL
Qty: 6 CAPSULE | Refills: 3 | OUTPATIENT
Start: 2025-07-23

## 2025-08-05 ENCOUNTER — OFFICE VISIT (OUTPATIENT)
Dept: FAMILY MEDICINE CLINIC | Facility: CLINIC | Age: 30
End: 2025-08-05
Payer: COMMERCIAL

## 2025-08-05 VITALS
BODY MASS INDEX: 43.87 KG/M2 | SYSTOLIC BLOOD PRESSURE: 124 MMHG | TEMPERATURE: 98 F | OXYGEN SATURATION: 100 % | DIASTOLIC BLOOD PRESSURE: 64 MMHG | WEIGHT: 273 LBS | HEIGHT: 66 IN | HEART RATE: 81 BPM

## 2025-08-05 DIAGNOSIS — R00.2 PALPITATIONS: Primary | ICD-10-CM

## 2025-08-05 DIAGNOSIS — I10 PRIMARY HYPERTENSION: ICD-10-CM

## 2025-08-05 DIAGNOSIS — E55.9 VITAMIN D DEFICIENCY: ICD-10-CM

## 2025-08-05 PROCEDURE — 99213 OFFICE O/P EST LOW 20 MIN: CPT | Performed by: PHYSICIAN ASSISTANT

## 2025-08-05 RX ORDER — ERGOCALCIFEROL 1.25 MG/1
50000 CAPSULE, LIQUID FILLED ORAL
Qty: 6 CAPSULE | Refills: 3 | Status: SHIPPED | OUTPATIENT
Start: 2025-08-05

## 2025-08-05 RX ORDER — PROPRANOLOL HYDROCHLORIDE 60 MG/1
60 CAPSULE, EXTENDED RELEASE ORAL DAILY
Qty: 90 CAPSULE | Refills: 3 | Status: SHIPPED | OUTPATIENT
Start: 2025-08-05